# Patient Record
Sex: FEMALE | Race: WHITE | NOT HISPANIC OR LATINO | Employment: FULL TIME | URBAN - METROPOLITAN AREA
[De-identification: names, ages, dates, MRNs, and addresses within clinical notes are randomized per-mention and may not be internally consistent; named-entity substitution may affect disease eponyms.]

---

## 2024-01-12 ENCOUNTER — ANCILLARY PROCEDURE (OUTPATIENT)
Dept: RADIOLOGY | Facility: CLINIC | Age: 59
End: 2024-01-12
Payer: COMMERCIAL

## 2024-01-12 DIAGNOSIS — Z12.31 ENCOUNTER FOR SCREENING MAMMOGRAM FOR MALIGNANT NEOPLASM OF BREAST: ICD-10-CM

## 2024-01-12 PROCEDURE — 77063 BREAST TOMOSYNTHESIS BI: CPT | Performed by: RADIOLOGY

## 2024-01-12 PROCEDURE — 77067 SCR MAMMO BI INCL CAD: CPT

## 2024-01-12 PROCEDURE — 77067 SCR MAMMO BI INCL CAD: CPT | Performed by: RADIOLOGY

## 2024-01-16 ENCOUNTER — HOSPITAL ENCOUNTER (OUTPATIENT)
Dept: RADIOLOGY | Facility: EXTERNAL LOCATION | Age: 59
Discharge: HOME | End: 2024-01-16

## 2024-01-18 DIAGNOSIS — Z12.11 COLON CANCER SCREENING: Primary | ICD-10-CM

## 2024-01-18 RX ORDER — POLYETHYLENE GLYCOL 3350, SODIUM SULFATE ANHYDROUS, SODIUM BICARBONATE, SODIUM CHLORIDE, POTASSIUM CHLORIDE 236; 22.74; 6.74; 5.86; 2.97 G/4L; G/4L; G/4L; G/4L; G/4L
4000 POWDER, FOR SOLUTION ORAL ONCE
Qty: 4000 ML | Refills: 0 | Status: SHIPPED | OUTPATIENT
Start: 2024-01-18 | End: 2024-01-18

## 2024-04-03 ENCOUNTER — LAB (OUTPATIENT)
Dept: LAB | Facility: LAB | Age: 59
End: 2024-04-03
Payer: COMMERCIAL

## 2024-04-03 LAB — COTININE UR QL SCN: NEGATIVE

## 2024-04-05 ENCOUNTER — OFFICE VISIT (OUTPATIENT)
Dept: GASTROENTEROLOGY | Facility: EXTERNAL LOCATION | Age: 59
End: 2024-04-05
Payer: COMMERCIAL

## 2024-04-05 DIAGNOSIS — K57.30 DIVERTICULOSIS OF LARGE INTESTINE WITHOUT DIVERTICULITIS: ICD-10-CM

## 2024-04-05 DIAGNOSIS — Z86.010 PERSONAL HISTORY OF COLONIC POLYPS: Primary | ICD-10-CM

## 2024-04-05 DIAGNOSIS — Z12.11 ENCOUNTER FOR SCREENING FOR MALIGNANT NEOPLASM OF COLON: ICD-10-CM

## 2024-04-05 PROCEDURE — 45378 DIAGNOSTIC COLONOSCOPY: CPT | Performed by: INTERNAL MEDICINE

## 2024-04-05 NOTE — PROGRESS NOTES
Colonoscopy performed today 4/5/2024 at the [facility] ([facility]).  See procedure report(s) under Media tab.

## 2024-04-09 ENCOUNTER — LAB (OUTPATIENT)
Dept: LAB | Facility: LAB | Age: 59
End: 2024-04-09
Payer: COMMERCIAL

## 2024-04-09 ENCOUNTER — OFFICE VISIT (OUTPATIENT)
Dept: OBSTETRICS AND GYNECOLOGY | Facility: CLINIC | Age: 59
End: 2024-04-09
Payer: COMMERCIAL

## 2024-04-09 VITALS — WEIGHT: 132 LBS | SYSTOLIC BLOOD PRESSURE: 118 MMHG | DIASTOLIC BLOOD PRESSURE: 70 MMHG

## 2024-04-09 DIAGNOSIS — R53.83 OTHER FATIGUE: ICD-10-CM

## 2024-04-09 DIAGNOSIS — N89.8 VAGINAL DRYNESS: ICD-10-CM

## 2024-04-09 DIAGNOSIS — Z12.31 SCREENING MAMMOGRAM, ENCOUNTER FOR: ICD-10-CM

## 2024-04-09 DIAGNOSIS — N94.89 ADNEXAL MASS: Primary | ICD-10-CM

## 2024-04-09 DIAGNOSIS — Z78.0 POSTMENOPAUSAL: ICD-10-CM

## 2024-04-09 DIAGNOSIS — Z87.81 HISTORY OF FRACTURE: ICD-10-CM

## 2024-04-09 DIAGNOSIS — Z12.4 PAP SMEAR FOR CERVICAL CANCER SCREENING: ICD-10-CM

## 2024-04-09 DIAGNOSIS — Z11.51 SCREENING FOR HPV (HUMAN PAPILLOMAVIRUS): ICD-10-CM

## 2024-04-09 LAB
25(OH)D3 SERPL-MCNC: 46 NG/ML (ref 30–100)
ALBUMIN SERPL BCP-MCNC: 4.9 G/DL (ref 3.4–5)
ALP SERPL-CCNC: 75 U/L (ref 33–110)
ALT SERPL W P-5'-P-CCNC: 19 U/L (ref 7–45)
ANION GAP SERPL CALC-SCNC: 12 MMOL/L (ref 10–20)
AST SERPL W P-5'-P-CCNC: 33 U/L (ref 9–39)
BASOPHILS # BLD AUTO: 0.07 X10*3/UL (ref 0–0.1)
BASOPHILS NFR BLD AUTO: 1.1 %
BILIRUB SERPL-MCNC: 0.6 MG/DL (ref 0–1.2)
BUN SERPL-MCNC: 14 MG/DL (ref 6–23)
CALCIUM SERPL-MCNC: 9.7 MG/DL (ref 8.6–10.3)
CHLORIDE SERPL-SCNC: 105 MMOL/L (ref 98–107)
CO2 SERPL-SCNC: 28 MMOL/L (ref 21–32)
CREAT SERPL-MCNC: 0.91 MG/DL (ref 0.5–1.05)
EGFRCR SERPLBLD CKD-EPI 2021: 73 ML/MIN/1.73M*2
EOSINOPHIL # BLD AUTO: 0.1 X10*3/UL (ref 0–0.7)
EOSINOPHIL NFR BLD AUTO: 1.6 %
ERYTHROCYTE [DISTWIDTH] IN BLOOD BY AUTOMATED COUNT: 13 % (ref 11.5–14.5)
GLUCOSE SERPL-MCNC: 81 MG/DL (ref 74–99)
HCT VFR BLD AUTO: 46.8 % (ref 36–46)
HGB BLD-MCNC: 15.6 G/DL (ref 12–16)
IMM GRANULOCYTES # BLD AUTO: 0.01 X10*3/UL (ref 0–0.7)
IMM GRANULOCYTES NFR BLD AUTO: 0.2 % (ref 0–0.9)
LYMPHOCYTES # BLD AUTO: 2.13 X10*3/UL (ref 1.2–4.8)
LYMPHOCYTES NFR BLD AUTO: 33.8 %
MCH RBC QN AUTO: 32 PG (ref 26–34)
MCHC RBC AUTO-ENTMCNC: 33.3 G/DL (ref 32–36)
MCV RBC AUTO: 96 FL (ref 80–100)
MONOCYTES # BLD AUTO: 0.28 X10*3/UL (ref 0.1–1)
MONOCYTES NFR BLD AUTO: 4.4 %
NEUTROPHILS # BLD AUTO: 3.72 X10*3/UL (ref 1.2–7.7)
NEUTROPHILS NFR BLD AUTO: 58.9 %
NRBC BLD-RTO: 0 /100 WBCS (ref 0–0)
PLATELET # BLD AUTO: 261 X10*3/UL (ref 150–450)
POTASSIUM SERPL-SCNC: 4.3 MMOL/L (ref 3.5–5.3)
PROT SERPL-MCNC: 7.4 G/DL (ref 6.4–8.2)
RBC # BLD AUTO: 4.88 X10*6/UL (ref 4–5.2)
SODIUM SERPL-SCNC: 141 MMOL/L (ref 136–145)
TSH SERPL-ACNC: 2.07 MIU/L (ref 0.44–3.98)
VIT B12 SERPL-MCNC: 416 PG/ML (ref 211–911)
WBC # BLD AUTO: 6.3 X10*3/UL (ref 4.4–11.3)

## 2024-04-09 PROCEDURE — 82306 VITAMIN D 25 HYDROXY: CPT

## 2024-04-09 PROCEDURE — 83002 ASSAY OF GONADOTROPIN (LH): CPT

## 2024-04-09 PROCEDURE — 80053 COMPREHEN METABOLIC PANEL: CPT

## 2024-04-09 PROCEDURE — 87624 HPV HI-RISK TYP POOLED RSLT: CPT

## 2024-04-09 PROCEDURE — 83001 ASSAY OF GONADOTROPIN (FSH): CPT

## 2024-04-09 PROCEDURE — 82607 VITAMIN B-12: CPT

## 2024-04-09 PROCEDURE — 82670 ASSAY OF TOTAL ESTRADIOL: CPT

## 2024-04-09 PROCEDURE — 36415 COLL VENOUS BLD VENIPUNCTURE: CPT

## 2024-04-09 PROCEDURE — 88141 CYTOPATH C/V INTERPRET: CPT | Performed by: PATHOLOGY

## 2024-04-09 PROCEDURE — 84443 ASSAY THYROID STIM HORMONE: CPT

## 2024-04-09 PROCEDURE — 88175 CYTOPATH C/V AUTO FLUID REDO: CPT

## 2024-04-09 PROCEDURE — 84481 FREE ASSAY (FT-3): CPT

## 2024-04-09 PROCEDURE — 99386 PREV VISIT NEW AGE 40-64: CPT | Performed by: OBSTETRICS & GYNECOLOGY

## 2024-04-09 PROCEDURE — 85025 COMPLETE CBC W/AUTO DIFF WBC: CPT

## 2024-04-09 PROCEDURE — 1036F TOBACCO NON-USER: CPT | Performed by: OBSTETRICS & GYNECOLOGY

## 2024-04-09 PROCEDURE — 84402 ASSAY OF FREE TESTOSTERONE: CPT

## 2024-04-09 RX ORDER — ESTRADIOL 0.1 MG/G
2 CREAM VAGINAL NIGHTLY
Qty: 34 G | Refills: 3 | Status: SHIPPED | OUTPATIENT
Start: 2024-04-09 | End: 2025-04-09

## 2024-04-09 RX ORDER — BISMUTH SUBSALICYLATE 262 MG
1 TABLET,CHEWABLE ORAL DAILY
COMMUNITY

## 2024-04-09 ASSESSMENT — ENCOUNTER SYMPTOMS: SLEEP DISTURBANCE: 1

## 2024-04-09 NOTE — PROGRESS NOTES
Subjective   Patient ID: Emi Mendoza is a 58 y.o. female who presents for Annual Exam (Here for annual having some menopause symptoms ).  HPI 58 years old G2 with LMP in 2016 presents for yearly exam.  Chief complaint feeling severe fatigue, insomnia and vaginal dryness.  She says that she still has some hot flashes but they are rare and tolerable.  She says that in her 40s when she is still having.  She had very mild hot flashes and it did not bother her much.  She is an athlete and runs marathons and says that she has had 2 fractures of her right ankle in the past due to fall and trauma but she is preparing for another [place] in 5/2024, but does not seem to have any energy to practice.  She reports strong family history of heart disease and multiple myeloma.  She is requesting some blood work done checking for vitamin deficiency and hormone levels.  She has same sexual partner but says that sex is very painful she feels pretty dry and is wondering if there is any option available to her.  She denies any chest pain or shortness of breath.  She feels that she has gained almost 10 pounds within the last year.  She does report decreased activity secondary to her ankle fracture within the last year.    Review of Systems   Endocrine: Positive for heat intolerance.   Genitourinary:  Positive for dyspareunia.   Psychiatric/Behavioral:  Positive for sleep disturbance.    All other systems reviewed and are negative.      Objective   Physical Exam  Vitals reviewed.   Constitutional:       Appearance: Normal appearance.   HENT:      Head: Normocephalic and atraumatic.      Nose: Nose normal.   Cardiovascular:      Rate and Rhythm: Normal rate and regular rhythm.   Pulmonary:      Effort: Pulmonary effort is normal.      Breath sounds: Normal breath sounds.   Chest:      Chest wall: No mass.   Breasts:     Right: Normal.      Left: Normal.   Abdominal:      General: Abdomen is flat. Bowel sounds are normal. There is no  distension.      Palpations: Abdomen is soft. There is no mass.   Genitourinary:     General: Normal vulva.      Vagina: Normal.      Cervix: Normal.      Uterus: Normal. Not enlarged.       Adnexa: Left adnexa normal.        Right: Fullness present. No mass.        Rectum: Normal.   Musculoskeletal:         General: Normal range of motion.      Cervical back: Normal range of motion.   Skin:     General: Skin is warm and dry.   Neurological:      General: No focal deficit present.      Mental Status: She is alert.   Psychiatric:         Mood and Affect: Mood normal.         Behavior: Behavior normal.         Assessment/Plan   Problem List Items Addressed This Visit    None  Visit Diagnoses         Codes    Adnexal mass    -  Primary N94.89    Relevant Orders    US PELVIS TRANSABDOMINAL WITH TRANSVAGINAL    Screening for HPV (human papillomavirus)     Z11.51    Relevant Orders    THINPREP PAP TEST    Pap smear for cervical cancer screening     Z12.4    Relevant Orders    THINPREP PAP TEST    Screening mammogram, encounter for     Z12.31    Vaginal dryness     N89.8    Relevant Medications    estradiol (Estrace) 0.01 % (0.1 mg/gram) vaginal cream    History of fracture     Z87.81    Relevant Orders    XR DEXA bone density    Other fatigue     R53.83    Relevant Orders    TSH with reflex to Free T4 if abnormal    Triiodothyronine, Free    FSH & LH    Estradiol    Testosterone,Free and Total    CBC and Auto Differential    Comprehensive Metabolic Panel    Vitamin D 25-Hydroxy,Total (for eval of Vitamin D levels)    Vitamin B12    Postmenopausal     Z78.0    Relevant Orders    FSH & LH    Estradiol        20 minutes on weaning.  She is on oral iron.  Mammogram was ordered.  Have also recommended baseline bone density.  Blood work were ordered as above.  We discussed hormone replacement therapy risks and have recommended topical estrogen vaginally instructions were reviewed.  Follow-up in office or telehealth after above  studies are resulted.         Karina Roberson MD 04/09/24 8:57 AM    2

## 2024-04-10 LAB
ESTRADIOL SERPL-MCNC: <19 PG/ML
FSH SERPL-ACNC: 122.5 IU/L
LH SERPL-ACNC: 56.6 IU/L
T3FREE SERPL-MCNC: 2.9 PG/ML (ref 2.3–4.2)

## 2024-04-14 LAB
TESTOSTERONE FREE (CHAN): 0.7 PG/ML (ref 0.1–6.4)
TESTOSTERONE,TOTAL,LC-MS/MS: 8 NG/DL (ref 2–45)

## 2024-04-18 ENCOUNTER — HOSPITAL ENCOUNTER (OUTPATIENT)
Dept: RADIOLOGY | Facility: CLINIC | Age: 59
Discharge: HOME | End: 2024-04-18
Payer: COMMERCIAL

## 2024-04-18 DIAGNOSIS — N94.89 ADNEXAL MASS: ICD-10-CM

## 2024-04-18 PROCEDURE — 76856 US EXAM PELVIC COMPLETE: CPT | Performed by: RADIOLOGY

## 2024-04-18 PROCEDURE — 76856 US EXAM PELVIC COMPLETE: CPT

## 2024-04-18 PROCEDURE — 76830 TRANSVAGINAL US NON-OB: CPT | Performed by: RADIOLOGY

## 2024-04-19 ENCOUNTER — OFFICE VISIT (OUTPATIENT)
Dept: OPHTHALMOLOGY | Facility: CLINIC | Age: 59
End: 2024-04-19
Payer: COMMERCIAL

## 2024-04-19 DIAGNOSIS — H52.12 MYOPIA, LEFT: Primary | ICD-10-CM

## 2024-04-19 DIAGNOSIS — H43.392 VITREOUS FLOATERS OF LEFT EYE: ICD-10-CM

## 2024-04-19 DIAGNOSIS — H52.4 PRESBYOPIA OF BOTH EYES: ICD-10-CM

## 2024-04-19 PROCEDURE — 92134 CPTRZ OPH DX IMG PST SGM RTA: CPT | Performed by: OPTOMETRIST

## 2024-04-19 PROCEDURE — 92015 DETERMINE REFRACTIVE STATE: CPT | Performed by: OPTOMETRIST

## 2024-04-19 PROCEDURE — 92004 COMPRE OPH EXAM NEW PT 1/>: CPT | Performed by: OPTOMETRIST

## 2024-04-19 ASSESSMENT — EXTERNAL EXAM - RIGHT EYE: OD_EXAM: NORMAL

## 2024-04-19 ASSESSMENT — VISUAL ACUITY
OD_SC: 20/20
OS_SC: 20/50
OS_PH_SC: 20/20
METHOD: SNELLEN - LINEAR

## 2024-04-19 ASSESSMENT — REFRACTION_MANIFEST
OS_CYLINDER: -0.50
OS_SPHERE: -0.75
OS_AXIS: 080
OS_AXIS: 075
OS_ADD: +2.25
OS_CYLINDER: -0.50
OD_SPHERE: PLANO
OS_SPHERE: -0.75
OD_ADD: +2.25
OD_CYLINDER: -0.25
OD_CYLINDER: -0.25
OD_AXIS: 165
OD_SPHERE: +0.25
OD_AXIS: 165
METHOD_AUTOREFRACTION: 1

## 2024-04-19 ASSESSMENT — CONF VISUAL FIELD
OS_SUPERIOR_NASAL_RESTRICTION: 0
OD_SUPERIOR_NASAL_RESTRICTION: 0
OD_INFERIOR_TEMPORAL_RESTRICTION: 0
OS_INFERIOR_NASAL_RESTRICTION: 0
OD_SUPERIOR_TEMPORAL_RESTRICTION: 0
OS_INFERIOR_TEMPORAL_RESTRICTION: 0
OS_SUPERIOR_TEMPORAL_RESTRICTION: 0
OD_NORMAL: 1
OS_NORMAL: 1
OD_INFERIOR_NASAL_RESTRICTION: 0

## 2024-04-19 ASSESSMENT — CUP TO DISC RATIO
OS_RATIO: 0.2
OD_RATIO: 0.2

## 2024-04-19 ASSESSMENT — TONOMETRY
OS_IOP_MMHG: 14
OD_IOP_MMHG: 13
IOP_METHOD: TONOPEN

## 2024-04-19 ASSESSMENT — ENCOUNTER SYMPTOMS
PSYCHIATRIC NEGATIVE: 0
ENDOCRINE NEGATIVE: 0
EYES NEGATIVE: 1
MUSCULOSKELETAL NEGATIVE: 0
HEMATOLOGIC/LYMPHATIC NEGATIVE: 0
CARDIOVASCULAR NEGATIVE: 0
GASTROINTESTINAL NEGATIVE: 0
NEUROLOGICAL NEGATIVE: 0
ALLERGIC/IMMUNOLOGIC NEGATIVE: 0
RESPIRATORY NEGATIVE: 0
CONSTITUTIONAL NEGATIVE: 0

## 2024-04-19 ASSESSMENT — EXTERNAL EXAM - LEFT EYE: OS_EXAM: NORMAL

## 2024-04-19 ASSESSMENT — SLIT LAMP EXAM - LIDS
COMMENTS: NORMAL
COMMENTS: NORMAL

## 2024-04-19 NOTE — PROGRESS NOTES
A spectacle prescription was dispensed to be used as needed. Can be used for SV distance glasses. We discussed an officelens for intermediate and close distance. Tried OTC readers and this did not feel comfortable.     RPE changes at inferior temporal OD and inferior OS and PVD OU. The patient was asked to return to our clinic or seek out eye care ASAP if new flashes of light or floaters are noted.      Minimal ERM OS.   Optical coherence tomography of the macula revealed:   OD: Normal foveal contour, photoreceptor, retinal pigment epithelium, IS/OS junction, central field 255 micron.  Vitreous hyaloid base not visualized.  Findings are c/w PVD  OS:  Normal foveal contour, photoreceptor, retinal pigment epithelium, IS/OS junction, central field 251 micron.  Vitreous hyaloid base not visualized.  Findings are c/w PVD    Post LASIK and OS for near.     NS and cortical changes and mild.     The patient was asked to return to our clinic in one year or sooner if ocular or vision changes occur.

## 2024-04-23 LAB
CYTOLOGY CMNT CVX/VAG CYTO-IMP: NORMAL
HPV HR 12 DNA GENITAL QL NAA+PROBE: POSITIVE
HPV HR GENOTYPES PNL CVX NAA+PROBE: POSITIVE
HPV16 DNA SPEC QL NAA+PROBE: NEGATIVE
HPV18 DNA SPEC QL NAA+PROBE: NEGATIVE
LAB AP HPV GENOTYPE QUESTION: YES
LAB AP HPV HR: NORMAL
LABORATORY COMMENT REPORT: NORMAL
PATH REPORT.TOTAL CANCER: NORMAL

## 2024-04-24 DIAGNOSIS — Z78.0 POSTMENOPAUSAL: Primary | ICD-10-CM

## 2024-04-24 RX ORDER — NORETHINDRONE ACETATE AND ETHINYL ESTRADIOL .5; 2.5 MG/1; UG/1
1 TABLET ORAL DAILY
Qty: 30 TABLET | Refills: 11 | Status: SHIPPED | OUTPATIENT
Start: 2024-04-24 | End: 2025-04-24

## 2024-04-26 ENCOUNTER — HOSPITAL ENCOUNTER (OUTPATIENT)
Dept: RADIOLOGY | Facility: CLINIC | Age: 59
Discharge: HOME | End: 2024-04-26
Payer: COMMERCIAL

## 2024-04-26 DIAGNOSIS — Z87.81 HISTORY OF FRACTURE: ICD-10-CM

## 2024-04-26 PROCEDURE — 77080 DXA BONE DENSITY AXIAL: CPT | Performed by: RADIOLOGY

## 2024-04-26 PROCEDURE — 77080 DXA BONE DENSITY AXIAL: CPT

## 2024-04-29 ENCOUNTER — PATIENT MESSAGE (OUTPATIENT)
Dept: OBSTETRICS AND GYNECOLOGY | Facility: CLINIC | Age: 59
End: 2024-04-29
Payer: COMMERCIAL

## 2024-05-06 NOTE — PATIENT COMMUNICATION
[employer] message was not yet read. Called and left detailed voicemail on patient machine at number on file [phone] regarding DEXA results and options as well as to repeat testing in 2-3 years. Encouraged to call if she would like to start a prescription medication for osteopenia or if she has any questions.

## 2024-05-14 ENCOUNTER — PROCEDURE VISIT (OUTPATIENT)
Dept: OBSTETRICS AND GYNECOLOGY | Facility: CLINIC | Age: 59
End: 2024-05-14
Payer: COMMERCIAL

## 2024-05-14 VITALS — DIASTOLIC BLOOD PRESSURE: 82 MMHG | WEIGHT: 132 LBS | SYSTOLIC BLOOD PRESSURE: 122 MMHG

## 2024-05-14 DIAGNOSIS — Z87.81 HISTORY OF FRACTURE: Primary | ICD-10-CM

## 2024-05-14 DIAGNOSIS — Z78.0 POSTMENOPAUSAL: ICD-10-CM

## 2024-05-14 DIAGNOSIS — R87.610 ASCUS WITH POSITIVE HIGH RISK HPV CERVICAL: ICD-10-CM

## 2024-05-14 DIAGNOSIS — R87.810 ASCUS WITH POSITIVE HIGH RISK HPV CERVICAL: ICD-10-CM

## 2024-05-14 PROCEDURE — 57456 ENDOCERV CURETTAGE W/SCOPE: CPT | Performed by: OBSTETRICS & GYNECOLOGY

## 2024-05-14 PROCEDURE — 99213 OFFICE O/P EST LOW 20 MIN: CPT | Performed by: OBSTETRICS & GYNECOLOGY

## 2024-05-14 NOTE — PROGRESS NOTES
Subjective   Patient ID: Emi Mendoza is a 59 y.o. female who presents for No chief complaint on file..  HPI 69 years old and recent ASCUS HPV HR positive on her Pap smear here for colposcopy.  .  She has a remote history of conization.  She denies any symptoms.  Not sexually active. Sex has been painful due to dryness.      Review of Systems   All other systems reviewed and are negative.      Objective   Physical Exam  Constitutional:       Appearance: Normal appearance.   Pulmonary:      Effort: Pulmonary effort is normal.   Abdominal:      General: Abdomen is flat.   Genitourinary:     General: Normal vulva.      Vagina: Normal.      Cervix: Normal.   Neurological:      Mental Status: She is alert.       Patient ID: Emi Mendoza is a 59 y.o. female.    Colposcopy    Date/Time: 5/14/2024 4:03 PM    Performed by: Karina Roberson MD  Authorized by: Karina Roberson MD    Procedure location: cervix    Consent:     Patient questions answered: yes      Risks and benefits of the procedure and its alternatives discussed: yes      Procedural risks discussed:  Bleeding    Consent obtained:  Verbal and written    Consent given by:  Patient  Indication:     Cervical indication(s): high-risk HPV positive and ASCUS    Pre-procedure:     Prep solution(s): acetic acid    Procedure:     Colposcopy with: colposcopy only      Colposcopy details:  Sever vaginal dryness no significant lesion seen.     Cervix visibility: not fully visualized      SCJ visibility: not fully visualized      Cervical impression: normal/benign      Assessment/Plan   Problem List Items Addressed This Visit    None  Visit Diagnoses         Codes    History of fracture    -  Primary Z87.81    Relevant Medications    denosumab (Prolia) 60 mg/mL syringe    Postmenopausal     Z78.0    Relevant Medications    denosumab (Prolia) 60 mg/mL syringe    ASCUS with positive high risk HPV cervical     R87.610, R87.810        I have recommended that she starts  using topical estrogen for the next 6 months as instructed before comes back in 6 months for repeat Pap smear.    We did discuss her recent bone density which shows osteopenia as she has had a couple of fractures in the past and wants to start on medication.  I have recommended Prolia and this was E scribed.  And repeat bone density in 2 years.         Karina Roberson MD 05/14/24 6:00 PM

## 2024-05-20 ENCOUNTER — APPOINTMENT (OUTPATIENT)
Dept: OBSTETRICS AND GYNECOLOGY | Facility: CLINIC | Age: 59
End: 2024-05-20
Payer: COMMERCIAL

## 2024-05-21 DIAGNOSIS — M81.0 AGE-RELATED OSTEOPOROSIS WITHOUT CURRENT PATHOLOGICAL FRACTURE: Primary | ICD-10-CM

## 2024-05-21 RX ORDER — RISEDRONATE SODIUM 150 MG/1
150 TABLET, FILM COATED ORAL
Qty: 1 TABLET | Refills: 11 | Status: SHIPPED | OUTPATIENT
Start: 2024-05-21 | End: 2025-05-21

## 2024-12-17 ENCOUNTER — APPOINTMENT (OUTPATIENT)
Dept: OBSTETRICS AND GYNECOLOGY | Facility: CLINIC | Age: 59
End: 2024-12-17
Payer: COMMERCIAL

## 2024-12-17 VITALS
BODY MASS INDEX: 21.49 KG/M2 | HEIGHT: 65 IN | SYSTOLIC BLOOD PRESSURE: 124 MMHG | DIASTOLIC BLOOD PRESSURE: 78 MMHG | WEIGHT: 129 LBS

## 2024-12-17 DIAGNOSIS — R87.610 ASCUS WITH POSITIVE HIGH RISK HPV CERVICAL: ICD-10-CM

## 2024-12-17 DIAGNOSIS — R87.619 ATYPICAL ENDOCERVICAL CELLS ON PAP SMEAR: Primary | ICD-10-CM

## 2024-12-17 DIAGNOSIS — R87.810 ASCUS WITH POSITIVE HIGH RISK HPV CERVICAL: ICD-10-CM

## 2024-12-17 PROCEDURE — 87624 HPV HI-RISK TYP POOLED RSLT: CPT

## 2024-12-17 PROCEDURE — 99213 OFFICE O/P EST LOW 20 MIN: CPT | Performed by: OBSTETRICS & GYNECOLOGY

## 2024-12-17 PROCEDURE — 88175 CYTOPATH C/V AUTO FLUID REDO: CPT

## 2025-01-02 LAB

## 2025-01-24 DIAGNOSIS — Z78.0 POSTMENOPAUSAL: ICD-10-CM

## 2025-01-27 RX ORDER — NORETHINDRONE ACETATE AND ETHINYL ESTRADIOL .5; 2.5 MG/1; UG/1
1 TABLET ORAL DAILY
Qty: 84 TABLET | Refills: 0 | Status: SHIPPED | OUTPATIENT
Start: 2025-01-27

## 2025-01-27 NOTE — TELEPHONE ENCOUNTER
Automatic electronic refill request from pharmacy.  Patient last seen: 12/17/24 for repeat pap  Upcoming appointment scheduled: 04/15/25 for annual  Refills pended for Dr. Roberson to review.

## 2025-04-12 DIAGNOSIS — M81.0 AGE-RELATED OSTEOPOROSIS WITHOUT CURRENT PATHOLOGICAL FRACTURE: ICD-10-CM

## 2025-04-14 DIAGNOSIS — M81.0 AGE-RELATED OSTEOPOROSIS WITHOUT CURRENT PATHOLOGICAL FRACTURE: ICD-10-CM

## 2025-04-14 RX ORDER — RISEDRONATE SODIUM 150 MG/1
150 TABLET, FILM COATED ORAL
Qty: 1 TABLET | Refills: 11 | Status: CANCELLED | OUTPATIENT
Start: 2025-04-14 | End: 2026-04-14

## 2025-04-14 RX ORDER — RISEDRONATE SODIUM 150 MG/1
TABLET, FILM COATED ORAL
Qty: 3 TABLET | Refills: 3 | Status: SHIPPED | OUTPATIENT
Start: 2025-04-14

## 2025-04-15 ENCOUNTER — APPOINTMENT (OUTPATIENT)
Dept: OBSTETRICS AND GYNECOLOGY | Facility: CLINIC | Age: 60
End: 2025-04-15
Payer: COMMERCIAL

## 2025-04-15 VITALS
HEIGHT: 65 IN | SYSTOLIC BLOOD PRESSURE: 120 MMHG | DIASTOLIC BLOOD PRESSURE: 80 MMHG | WEIGHT: 130.6 LBS | BODY MASS INDEX: 21.76 KG/M2

## 2025-04-15 DIAGNOSIS — R87.610 ATYPICAL SQUAMOUS CELLS OF UNDETERMINED SIGNIFICANCE (ASCUS) ON PAPANICOLAOU SMEAR OF CERVIX: ICD-10-CM

## 2025-04-15 DIAGNOSIS — Z12.31 ENCOUNTER FOR SCREENING MAMMOGRAM FOR MALIGNANT NEOPLASM OF BREAST: ICD-10-CM

## 2025-04-15 DIAGNOSIS — N89.8 VAGINAL DRYNESS: ICD-10-CM

## 2025-04-15 DIAGNOSIS — Z78.0 POSTMENOPAUSAL: ICD-10-CM

## 2025-04-15 DIAGNOSIS — R87.610 ASCUS WITH POSITIVE HIGH RISK HPV CERVICAL: Primary | ICD-10-CM

## 2025-04-15 DIAGNOSIS — R87.810 ASCUS WITH POSITIVE HIGH RISK HPV CERVICAL: Primary | ICD-10-CM

## 2025-04-15 PROCEDURE — 1036F TOBACCO NON-USER: CPT | Performed by: OBSTETRICS & GYNECOLOGY

## 2025-04-15 PROCEDURE — 87624 HPV HI-RISK TYP POOLED RSLT: CPT

## 2025-04-15 PROCEDURE — 88175 CYTOPATH C/V AUTO FLUID REDO: CPT

## 2025-04-15 PROCEDURE — 99396 PREV VISIT EST AGE 40-64: CPT | Performed by: OBSTETRICS & GYNECOLOGY

## 2025-04-15 PROCEDURE — 3008F BODY MASS INDEX DOCD: CPT | Performed by: OBSTETRICS & GYNECOLOGY

## 2025-04-15 RX ORDER — ESTRADIOL 0.1 MG/G
2 CREAM VAGINAL NIGHTLY
Qty: 34 G | Refills: 3 | Status: SHIPPED | OUTPATIENT
Start: 2025-04-15 | End: 2026-04-15

## 2025-04-15 RX ORDER — NORETHINDRONE ACETATE AND ETHINYL ESTRADIOL .5; 2.5 MG/1; UG/1
1 TABLET ORAL DAILY
Qty: 84 TABLET | Refills: 3 | Status: SHIPPED | OUTPATIENT
Start: 2025-04-15

## 2025-04-15 NOTE — PROGRESS NOTES
Subjective   Patient ID: Emi Mendoza is a 59 y.o. female who presents for No chief complaint on file..  HPI 59-year-old G2, P2 on hormone replacement therapy presents for yearly exam.  She was to continue with HRT and requesting a refill of estrogen cream as well.  She has not been sexually active recently.  She denies any postmenopausal bleeding.    Review of Systems   All other systems reviewed and are negative.      Objective   Physical Exam  Vitals reviewed.   Constitutional:       Appearance: Normal appearance.   HENT:      Head: Normocephalic and atraumatic.      Nose: Nose normal.   Cardiovascular:      Rate and Rhythm: Normal rate and regular rhythm.   Pulmonary:      Effort: Pulmonary effort is normal.      Breath sounds: Normal breath sounds.   Chest:      Chest wall: No mass.   Breasts:     Right: Normal.      Left: Normal.   Abdominal:      General: Abdomen is flat. Bowel sounds are normal. There is no distension.      Palpations: Abdomen is soft. There is no mass.   Genitourinary:     General: Normal vulva.      Vagina: Normal.      Cervix: Normal.      Uterus: Normal.       Adnexa: Right adnexa normal and left adnexa normal.      Rectum: Normal.   Musculoskeletal:         General: Normal range of motion.      Cervical back: Normal range of motion.   Skin:     General: Skin is warm and dry.   Neurological:      General: No focal deficit present.      Mental Status: She is alert.   Psychiatric:         Mood and Affect: Mood normal.         Behavior: Behavior normal.         Assessment/Plan   Problem List Items Addressed This Visit             ICD-10-CM    ASCUS with positive high risk HPV cervical - Primary R87.610, R87.810     Other Visit Diagnoses         Codes    Encounter for screening mammogram for malignant neoplasm of breast     Z12.31    Relevant Orders    BI mammo bilateral screening tomosynthesis    Atypical squamous cells of undetermined significance (ASCUS) on Papanicolaou smear of  cervix     R87.610    Relevant Orders    THINPREP PAP    Postmenopausal     Z78.0    Relevant Medications    norethindrone ac-eth estradioL (Femhrt Low Dose) 0.5-2.5 mg-mcg tablet    Vaginal dryness     N89.8    Relevant Medications    estradiol (Estrace) 0.01 % (0.1 mg/gram) vaginal cream        The Pap smear was done and mammogram was ordered.  Per her request refill of HRT and Estrace cream was also E scribed.  I have encouraged her to continue with regular exercises, self breast exam calcium and vitamin D supplement.  She is on Actonel  For osteopenia without any side effects.       Karina Roberson MD 04/15/25 8:40 AM

## 2025-04-28 LAB
CYTOLOGY CMNT CVX/VAG CYTO-IMP: NORMAL
HPV HR 12 DNA GENITAL QL NAA+PROBE: NEGATIVE
HPV HR GENOTYPES PNL CVX NAA+PROBE: NEGATIVE
HPV16 DNA SPEC QL NAA+PROBE: NEGATIVE
HPV18 DNA SPEC QL NAA+PROBE: NEGATIVE
LAB AP HPV GENOTYPE QUESTION: YES
LAB AP HPV HR: NORMAL
LABORATORY COMMENT REPORT: NORMAL
PATH REPORT.TOTAL CANCER: NORMAL

## 2025-04-29 ENCOUNTER — HOSPITAL ENCOUNTER (OUTPATIENT)
Dept: RADIOLOGY | Facility: CLINIC | Age: 60
Discharge: HOME | End: 2025-04-29
Payer: COMMERCIAL

## 2025-04-29 VITALS — HEIGHT: 64 IN | WEIGHT: 125 LBS | BODY MASS INDEX: 21.34 KG/M2

## 2025-04-29 DIAGNOSIS — Z12.31 ENCOUNTER FOR SCREENING MAMMOGRAM FOR MALIGNANT NEOPLASM OF BREAST: ICD-10-CM

## 2025-04-29 PROCEDURE — 77067 SCR MAMMO BI INCL CAD: CPT | Performed by: RADIOLOGY

## 2025-04-29 PROCEDURE — 77063 BREAST TOMOSYNTHESIS BI: CPT | Performed by: RADIOLOGY

## 2025-04-29 PROCEDURE — 77067 SCR MAMMO BI INCL CAD: CPT

## 2025-05-02 ENCOUNTER — APPOINTMENT (OUTPATIENT)
Dept: OPHTHALMOLOGY | Facility: CLINIC | Age: 60
End: 2025-05-02
Payer: COMMERCIAL

## 2025-05-02 DIAGNOSIS — H35.372 EPIRETINAL MEMBRANE (ERM) OF LEFT EYE: ICD-10-CM

## 2025-05-02 DIAGNOSIS — H52.12 MYOPIA, LEFT EYE: Primary | ICD-10-CM

## 2025-05-02 PROCEDURE — 92015 DETERMINE REFRACTIVE STATE: CPT | Performed by: OPTOMETRIST

## 2025-05-02 PROCEDURE — 92014 COMPRE OPH EXAM EST PT 1/>: CPT | Performed by: OPTOMETRIST

## 2025-05-02 PROCEDURE — 92134 CPTRZ OPH DX IMG PST SGM RTA: CPT | Performed by: OPTOMETRIST

## 2025-05-02 ASSESSMENT — REFRACTION_MANIFEST
OS_SPHERE: -0.75
OD_SPHERE: PLANO
OS_AXIS: 080
OD_CYLINDER: -0.50
OS_CYLINDER: -0.50
OS_ADD: +2.25
OD_AXIS: 165
OD_ADD: +2.25

## 2025-05-02 ASSESSMENT — ENCOUNTER SYMPTOMS
NEUROLOGICAL NEGATIVE: 0
ENDOCRINE NEGATIVE: 0
RESPIRATORY NEGATIVE: 0
EYES NEGATIVE: 1
ALLERGIC/IMMUNOLOGIC NEGATIVE: 0
PSYCHIATRIC NEGATIVE: 0
GASTROINTESTINAL NEGATIVE: 0
HEMATOLOGIC/LYMPHATIC NEGATIVE: 0
CONSTITUTIONAL NEGATIVE: 0
CARDIOVASCULAR NEGATIVE: 0
MUSCULOSKELETAL NEGATIVE: 0

## 2025-05-02 ASSESSMENT — EXTERNAL EXAM - RIGHT EYE: OD_EXAM: NORMAL

## 2025-05-02 ASSESSMENT — CONF VISUAL FIELD
OS_INFERIOR_NASAL_RESTRICTION: 0
OD_INFERIOR_TEMPORAL_RESTRICTION: 0
OD_INFERIOR_NASAL_RESTRICTION: 0
OS_INFERIOR_TEMPORAL_RESTRICTION: 0
OD_SUPERIOR_TEMPORAL_RESTRICTION: 0
OD_SUPERIOR_NASAL_RESTRICTION: 0
OS_NORMAL: 1
OS_SUPERIOR_TEMPORAL_RESTRICTION: 0
OD_NORMAL: 1
OS_SUPERIOR_NASAL_RESTRICTION: 0

## 2025-05-02 ASSESSMENT — TONOMETRY
OS_IOP_MMHG: 14
OD_IOP_MMHG: 14
IOP_METHOD: GOLDMANN APPLANATION

## 2025-05-02 ASSESSMENT — VISUAL ACUITY
OS_PH_SC: 20/20
METHOD: SNELLEN - LINEAR
OD_SC: 20/20
OS_SC: 20/40
OS_PH_SC+: -

## 2025-05-02 ASSESSMENT — CUP TO DISC RATIO
OS_RATIO: 0.2
OD_RATIO: 0.2

## 2025-05-02 ASSESSMENT — EXTERNAL EXAM - LEFT EYE: OS_EXAM: NORMAL

## 2025-05-02 ASSESSMENT — SLIT LAMP EXAM - LIDS
COMMENTS: NORMAL
COMMENTS: NORMAL

## 2025-05-02 NOTE — PROGRESS NOTES
A spectacle prescription was dispensed to be used as needed. Can be used for SV distance glasses. We discussed an officelens for intermediate and close distance. Tried OTC readers and this did not feel comfortable.     RPE changes at inferior temporal OD and inferior OS and PVD OU. The patient was asked to return to our clinic or seek out eye care ASAP if new flashes of light or floaters are noted.      Minimal ERM OS.   Optical coherence tomography of the macula revealed:   OD: Normal foveal contour, photoreceptor, retinal pigment epithelium, IS/OS junction, central field 256 was 255 micron.  Vitreous hyaloid base not visualized.  Findings are c/w PVD  OS:  Normal foveal contour, photoreceptor, retinal pigment epithelium, IS/OS junction, central field 253 was 251 micron.  Vitreous hyaloid base not visualized.  Findings are c/w ERM and PVD    Post LASIK and OS for near.     NS and cortical changes and mild.     The patient was asked to return to our clinic in one year or sooner if ocular or vision changes occur.

## 2026-04-28 ENCOUNTER — APPOINTMENT (OUTPATIENT)
Dept: OBSTETRICS AND GYNECOLOGY | Facility: CLINIC | Age: 61
End: 2026-04-28
Payer: COMMERCIAL

## 2026-05-15 ENCOUNTER — APPOINTMENT (OUTPATIENT)
Dept: OPHTHALMOLOGY | Age: 61
End: 2026-05-15
Payer: COMMERCIAL